# Patient Record
Sex: FEMALE | Race: WHITE | NOT HISPANIC OR LATINO | URBAN - METROPOLITAN AREA
[De-identification: names, ages, dates, MRNs, and addresses within clinical notes are randomized per-mention and may not be internally consistent; named-entity substitution may affect disease eponyms.]

---

## 2019-02-22 ENCOUNTER — ANNUAL EXAM (OUTPATIENT)
Dept: OBGYN CLINIC | Facility: CLINIC | Age: 24
End: 2019-02-22
Payer: COMMERCIAL

## 2019-02-22 VITALS
HEIGHT: 65 IN | SYSTOLIC BLOOD PRESSURE: 120 MMHG | WEIGHT: 128.5 LBS | BODY MASS INDEX: 21.41 KG/M2 | DIASTOLIC BLOOD PRESSURE: 74 MMHG

## 2019-02-22 DIAGNOSIS — Z01.419 CERVICAL SMEAR, AS PART OF ROUTINE GYNECOLOGICAL EXAMINATION: ICD-10-CM

## 2019-02-22 DIAGNOSIS — Z01.419 ENCOUNTER FOR WELL WOMAN EXAM: Primary | ICD-10-CM

## 2019-02-22 DIAGNOSIS — Z11.3 SCREEN FOR SEXUALLY TRANSMITTED DISEASES: ICD-10-CM

## 2019-02-22 DIAGNOSIS — Z11.8 SPECIAL SCREENING EXAMINATION FOR CHLAMYDIAL DISEASE: ICD-10-CM

## 2019-02-22 PROCEDURE — S0612 ANNUAL GYNECOLOGICAL EXAMINA: HCPCS | Performed by: PHYSICIAN ASSISTANT

## 2019-02-22 RX ORDER — ALPRAZOLAM 0.25 MG/1
TABLET ORAL
COMMUNITY

## 2019-02-22 RX ORDER — DROSPIRENONE AND ETHINYL ESTRADIOL 0.02-3(28)
1 KIT ORAL DAILY
COMMUNITY
End: 2020-08-13

## 2019-02-22 RX ORDER — TRAZODONE HYDROCHLORIDE 50 MG/1
50 TABLET ORAL
COMMUNITY

## 2019-02-22 NOTE — PROGRESS NOTES
Pt is here for yearly exam  PT currently on generic Tanika  Pt concerned about weight gain on generic Tanika  Pt states her diet has not changed  Pt is having regular cycles on BCP  Pt has no breast concerns  PT is due for her first pap smear      3/5/18 Negative Chlamydia   2/19/18 +Chlamydia

## 2019-02-22 NOTE — PROGRESS NOTES
Assessment/Plan:    No problem-specific Assessment & Plan notes found for this encounter  Diagnoses and all orders for this visit:    Encounter for well woman exam    Cervical smear, as part of routine gynecological examination  -     GP PAP/CT/GC (Reflex HPV PLUS when ASC-US)    Special screening examination for chlamydial disease  -     GP PAP/CT/GC (Reflex HPV PLUS when ASC-US)    Screen for sexually transmitted diseases  -     GP PAP/CT/GC (Reflex HPV PLUS when ASC-US)    Other orders  -     drospirenone-ethinyl estradiol (TRINO) 3-0 02 MG per tablet; Take 1 tablet by mouth daily  -     ALPRAZolam (XANAX) 0 25 mg tablet; Take by mouth daily at bedtime as needed for anxiety  -     traZODone (DESYREL) 50 mg tablet; Take 50 mg by mouth daily at bedtime          Subjective:      Patient ID: Jennifer Conti is a 26 y o  female  Pt presents today for her annual exam   She has been taking Trino for a few years  She has no issues or concerns except an increase in weight over the last few months  She has not changed diet or exercise  She has stopped taking all other meds (h/o anxiety)  Periods are regular, short and light  Bowel and bladder regular  No breast concerns  H/o chlamydia--re-check was negative one year ago  The following portions of the patient's history were reviewed and updated as appropriate: allergies, current medications, past family history, past medical history, past social history, past surgical history and problem list     Review of Systems   Constitutional: Negative for chills, fever and unexpected weight change  Gastrointestinal: Negative for abdominal pain, blood in stool, constipation and diarrhea  Genitourinary: Negative            Objective:      /74 (BP Location: Right arm, Patient Position: Sitting, Cuff Size: Standard)   Ht 5' 5" (1 651 m)   Wt 58 3 kg (128 lb 8 oz)   LMP 02/09/2019 (Exact Date)   BMI 21 38 kg/m²          Physical Exam   Constitutional: She appears well-developed and well-nourished  HENT:   Head: Normocephalic and atraumatic  Neck: Normal range of motion  Pulmonary/Chest: Right breast exhibits no inverted nipple, no mass, no nipple discharge and no skin change  Left breast exhibits no inverted nipple, no mass, no nipple discharge and no skin change  Abdominal: Soft  Genitourinary: Vagina normal and uterus normal  Pelvic exam was performed with patient supine  There is no rash, tenderness or lesion on the right labia  There is no rash, tenderness or lesion on the left labia  Cervix exhibits no motion tenderness, no discharge and no friability  Right adnexum displays no mass, no tenderness and no fullness  Left adnexum displays no mass, no tenderness and no fullness  Lymphadenopathy: No inguinal adenopathy noted on the right or left side  Nursing note and vitals reviewed

## 2019-02-27 LAB
DEPRECATED C TRACH RRNA XXX QL PRB: NOT DETECTED
N GONORRHOEA DNA UR QL NAA+PROBE: NOT DETECTED
THIN PREP CVX: ABNORMAL

## 2019-03-05 ENCOUNTER — TELEPHONE (OUTPATIENT)
Dept: OBGYN CLINIC | Facility: CLINIC | Age: 24
End: 2019-03-05

## 2019-03-05 NOTE — TELEPHONE ENCOUNTER
----- Message from Jennifer Fontanez PA-C sent at 2/28/2019 10:37 AM EST -----  Pt 24yo  Rec repeat pap in 4--6 months for mild viral changes (HPV)    Also, rx terazol 3 cream for pos   Yeast

## 2019-03-06 DIAGNOSIS — B37.9 YEAST INFECTION: Primary | ICD-10-CM

## 2019-06-17 ENCOUNTER — OFFICE VISIT (OUTPATIENT)
Dept: OBGYN CLINIC | Facility: CLINIC | Age: 24
End: 2019-06-17
Payer: COMMERCIAL

## 2019-06-17 VITALS
BODY MASS INDEX: 22.62 KG/M2 | DIASTOLIC BLOOD PRESSURE: 66 MMHG | HEIGHT: 64 IN | WEIGHT: 132.5 LBS | SYSTOLIC BLOOD PRESSURE: 106 MMHG

## 2019-06-17 DIAGNOSIS — IMO0001 BIRTH CONTROL: ICD-10-CM

## 2019-06-17 DIAGNOSIS — R87.612 LGSIL ON PAP SMEAR OF CERVIX: Primary | ICD-10-CM

## 2019-06-17 PROCEDURE — 99213 OFFICE O/P EST LOW 20 MIN: CPT | Performed by: PHYSICIAN ASSISTANT

## 2019-06-17 RX ORDER — DROSPIRENONE AND ETHINYL ESTRADIOL 0.02-3(28)
1 KIT ORAL DAILY
Qty: 30 TABLET | Refills: 6 | Status: SHIPPED | OUTPATIENT
Start: 2019-06-17 | End: 2020-08-13

## 2019-06-21 LAB — THIN PREP CVX: ABNORMAL

## 2019-06-27 ENCOUNTER — PROCEDURE VISIT (OUTPATIENT)
Dept: OBGYN CLINIC | Facility: CLINIC | Age: 24
End: 2019-06-27
Payer: COMMERCIAL

## 2019-06-27 VITALS
BODY MASS INDEX: 21.66 KG/M2 | HEIGHT: 65 IN | SYSTOLIC BLOOD PRESSURE: 144 MMHG | DIASTOLIC BLOOD PRESSURE: 70 MMHG | WEIGHT: 130 LBS

## 2019-06-27 DIAGNOSIS — R87.613 HGSIL (HIGH GRADE SQUAMOUS INTRAEPITHELIAL LESION) ON PAP SMEAR OF CERVIX: Primary | ICD-10-CM

## 2019-06-27 PROCEDURE — 57454 BX/CURETT OF CERVIX W/SCOPE: CPT | Performed by: OBSTETRICS & GYNECOLOGY

## 2019-07-01 LAB
BIOPSY SPEC-IMP: NORMAL
OTHER STN CVX: NORMAL
WOMEN'S HEALTH PATHOLOGY REPORT: NORMAL

## 2019-07-17 ENCOUNTER — PROCEDURE VISIT (OUTPATIENT)
Dept: OBGYN CLINIC | Facility: CLINIC | Age: 24
End: 2019-07-17
Payer: COMMERCIAL

## 2019-07-17 VITALS
WEIGHT: 132 LBS | HEIGHT: 66 IN | SYSTOLIC BLOOD PRESSURE: 120 MMHG | DIASTOLIC BLOOD PRESSURE: 84 MMHG | BODY MASS INDEX: 21.21 KG/M2

## 2019-07-17 DIAGNOSIS — D06.9 SEVERE CERVICAL DYSPLASIA: Primary | ICD-10-CM

## 2019-07-17 PROCEDURE — 57455 BIOPSY OF CERVIX W/SCOPE: CPT | Performed by: OBSTETRICS & GYNECOLOGY

## 2019-07-17 NOTE — PROGRESS NOTES
Colposcopy  Date/Time: 7/17/2019 4:50 PM  Performed by: Lawrence Hidalgo MD  Authorized by: Lawrence Hidalgo MD     Consent:     Consent obtained:  Written    Consent given by:  Patient    Procedural risks discussed:  Bleeding and infection    Patient questions answered: yes      Patient agrees, verbalizes understanding, and wants to proceed: yes      Educational handouts given: no      Instructions and paperwork completed: no    Universal protocol:     Patient states understanding of procedure being performed: yes      Relevant documents present and verified: yes      Test results available and properly labeled: yes      Imaging studies available: no      Required blood products, implants, devices, and special equipment available: no      Site marked: no    Pre-procedure:     Pre-procedure timeout performed: yes      Prepped with: acetic acid      Local anesthetic:  Xylocaine WITH epinephrine  Indication:     Indication:  LSIL  Procedure:     Procedure: Colposcopy w/ biopsy of cervix      Under satisfactory analgesia the patient was prepped and draped in the dorsal lithotomy position: no      [place] speculum was placed in the vagina: yes      Under colposcopic examination the transition zone was seen in entirety: yes      Intracervical block was performed: no      Cervical biopsy performed with a cervical biopsy punch: yes      Tampon inserted: no      Monsel's solution was applied: yes      Biopsy(s): yes      Location:  9 and 12     Specimen to pathology: yes    Post-procedure:     Impression: Low grade cervical dysplasia      Patient tolerance of procedure: Tolerated well, no immediate complications  Comments:      Patient presented for LEEP based on prior colposcopy findings  However, on examination today the cervical lesions seemed to regress greatly and it was decided to re-colpo instead of LEEP today  Findings: flat, mildly acetic white changes from 11-1 oclock  Small acetic white flat changes at 9  Impression:   LGSIL vs  Chronic inflammation     Plan:   Check biopsy

## 2019-07-22 LAB — OTHER STN CVX: ABNORMAL

## 2020-04-13 DIAGNOSIS — Z30.41 SURVEILLANCE FOR BIRTH CONTROL, ORAL CONTRACEPTIVES: Primary | ICD-10-CM

## 2020-04-13 RX ORDER — DROSPIRENONE AND ETHINYL ESTRADIOL 0.02-3(28)
1 KIT ORAL DAILY
Qty: 90 TABLET | Refills: 1 | Status: SHIPPED | OUTPATIENT
Start: 2020-04-13 | End: 2020-08-13

## 2020-08-12 ENCOUNTER — TELEPHONE (OUTPATIENT)
Dept: OBGYN CLINIC | Facility: CLINIC | Age: 25
End: 2020-08-12

## 2020-08-13 ENCOUNTER — ANNUAL EXAM (OUTPATIENT)
Dept: OBGYN CLINIC | Facility: CLINIC | Age: 25
End: 2020-08-13
Payer: COMMERCIAL

## 2020-08-13 VITALS
SYSTOLIC BLOOD PRESSURE: 118 MMHG | HEIGHT: 65 IN | TEMPERATURE: 98.9 F | DIASTOLIC BLOOD PRESSURE: 72 MMHG | WEIGHT: 135.2 LBS | HEART RATE: 86 BPM | OXYGEN SATURATION: 97 % | BODY MASS INDEX: 22.53 KG/M2

## 2020-08-13 DIAGNOSIS — Z11.3 SCREEN FOR SEXUALLY TRANSMITTED DISEASES: ICD-10-CM

## 2020-08-13 DIAGNOSIS — Z30.09 COUNSELING FOR BIRTH CONTROL REGARDING INTRAUTERINE DEVICE (IUD): ICD-10-CM

## 2020-08-13 DIAGNOSIS — Z01.411 ENCOUNTER FOR GYNECOLOGICAL EXAMINATION WITH ABNORMAL FINDING: Primary | ICD-10-CM

## 2020-08-13 DIAGNOSIS — N87.0 DYSPLASIA OF CERVIX, LOW GRADE (CIN 1): ICD-10-CM

## 2020-08-13 PROCEDURE — S0612 ANNUAL GYNECOLOGICAL EXAMINA: HCPCS | Performed by: NURSE PRACTITIONER

## 2020-08-13 PROCEDURE — G0145 SCR C/V CYTO,THINLAYER,RESCR: HCPCS | Performed by: NURSE PRACTITIONER

## 2020-08-13 RX ORDER — TRIAMCINOLONE ACETONIDE 1 MG/G
CREAM TOPICAL 3 TIMES DAILY
COMMUNITY
Start: 2019-10-31 | End: 2020-10-30

## 2020-08-13 NOTE — PROGRESS NOTES
Assessment / Plan    1  Encounter for gynecological examination with abnormal finding  Normal well woman exam    2  Dysplasia of cervix, low grade (ABDI 1)  2019  Pap smear obtained  3  Screen for sexually transmitted diseases  Agrees to g/c screening    4  Counseling for birth control regarding intrauterine device (IUD)  Discussed risks/ benefits  Emphasized need for condoms for STD protection  Given [place] and advised to check her insurance coverage  Sabine Virgen is a 23 y o  female who presents for her annual gynecologic exam   New to our office  Prior care with Sameera Can  No problems to report  Interested in IUD  Used to use OCP but aggravated her moods  2019 Colpo, LGSIL: path ABDI 1  2019 HGSIL on pap  2019 LGSIL  STD screenin2018; agrees to    Periods are regular   Current contraception: abstinence  History of abnormal Pap smear: yes -   Family history of breast,uterine, ovarian or colon cancer: no    Menstrual History:  OB History        0    Para   0    Term   0       0    AB   0    Living   0       SAB   0    TAB   0    Ectopic   0    Multiple   0    Live Births   0           Obstetric Comments   Menarche 17              Patient's last menstrual period was 2020 (approximate)  Period Cycle (Days): 28  Period Duration (Days): 5  Period Pattern: Regular  Menstrual Flow: Heavy, Moderate  Menstrual Control: Tampon, Maxi pad, Thin pad, Panty liner    The following portions of the patient's history were reviewed and updated as appropriate: allergies, current medications, past family history, past medical history, past social history, past surgical history and problem list     Review of Systems      Review of Systems   Constitutional: Negative for chills and fever  Gastrointestinal: Negative for abdominal distention, abdominal pain, blood in stool, constipation, diarrhea, nausea and vomiting     Genitourinary: Negative for difficulty urinating, dysuria, frequency, genital sores, hematuria, menstrual problem, pelvic pain, urgency, vaginal bleeding and vaginal discharge  Breasts:  Negative for skin changes, dimpling, asymmetry, nipple discharge, redness, tenderness or palpable masses    Objective      /72 (BP Location: Left arm, Patient Position: Sitting, Cuff Size: Standard)   Pulse 86   Temp 98 9 °F (37 2 °C)   Ht 5' 5" (1 651 m)   Wt 61 3 kg (135 lb 3 2 oz)   LMP 07/30/2020 (Approximate)   SpO2 97%   Breastfeeding No   BMI 22 50 kg/m²   Physical Exam  Constitutional:       General: She is not in acute distress  Appearance: Normal appearance  She is well-developed and normal weight  She is not ill-appearing or diaphoretic  HENT:      Head: Normocephalic and atraumatic  Eyes:      Pupils: Pupils are equal, round, and reactive to light  Neck:      Musculoskeletal: Neck supple  Thyroid: No thyromegaly  Pulmonary:      Effort: Pulmonary effort is normal    Chest:      Breasts: Breasts are symmetrical          Right: No inverted nipple, mass, nipple discharge, skin change or tenderness  Left: No inverted nipple, mass, nipple discharge, skin change or tenderness  Abdominal:      Palpations: Abdomen is soft  There is no mass  Tenderness: There is no abdominal tenderness  Genitourinary:     Exam position: Lithotomy position  Labia:         Right: No rash, tenderness, lesion or injury  Left: No rash, tenderness, lesion or injury  Vagina: No signs of injury and foreign body  No vaginal discharge, erythema, tenderness or bleeding  Cervix: No cervical motion tenderness, discharge or friability  Uterus: Not enlarged and not tender  Adnexa:         Right: No mass or tenderness  Left: No mass or tenderness  Lymphadenopathy:      Cervical: No cervical adenopathy  Upper Body:      Right upper body: No supraclavicular adenopathy        Left upper body: No supraclavicular adenopathy  Skin:     General: Skin is warm and dry  Neurological:      General: No focal deficit present  Mental Status: She is alert and oriented to person, place, and time  Psychiatric:         Mood and Affect: Mood normal          Behavior: Behavior normal          Thought Content:  Thought content normal          Judgment: Judgment normal

## 2020-08-18 LAB
LAB AP GYN PRIMARY INTERPRETATION: NORMAL
Lab: NORMAL

## 2020-09-15 ENCOUNTER — TELEPHONE (OUTPATIENT)
Dept: OBGYN CLINIC | Facility: CLINIC | Age: 25
End: 2020-09-15

## 2020-09-15 DIAGNOSIS — Z30.011 ORAL CONTRACEPTIVE PRESCRIBED: Primary | ICD-10-CM

## 2020-09-15 RX ORDER — NORETHINDRONE ACETATE AND ETHINYL ESTRADIOL AND FERROUS FUMARATE 1MG-20(24)
1 KIT ORAL DAILY
Qty: 84 TABLET | Refills: 4 | Status: SHIPPED | OUTPATIENT
Start: 2020-09-15

## 2020-09-15 NOTE — TELEPHONE ENCOUNTER
Patient called, was seen on 8/13/20 for yearly and discussed birthcontrol options  Patient has decided she would like to begin birthcontrol pills and would like it sent to [facility] pharmacy in [address] [place] [address] Drive

## 2020-09-15 NOTE — TELEPHONE ENCOUNTER
Order for [facility] sent to her pharmacy on record  Please inform her to start taking the pill with the start of her next menstrual cycle, same time daily  Use condoms for back up and for STD protection